# Patient Record
Sex: MALE | Race: WHITE
[De-identification: names, ages, dates, MRNs, and addresses within clinical notes are randomized per-mention and may not be internally consistent; named-entity substitution may affect disease eponyms.]

---

## 2020-01-02 NOTE — ED
SOB HPI





- General


Chief Complaint: Shortness of Breath


Stated Complaint: SOB


Time Seen by Provider: 01/02/20 05:09


Source: patient


Mode of arrival: ambulatory


Limitations: no limitations





- History of Present Illness


Initial Comments: 





This patient is a 54-year-old man who presents with the complaint of dyspnea 

going on for approximately 3 months.  The patient's does not have a primary 

physician.  He states that he was previously told that he has hypertension but 

had not been able to afford his medications has not been on these for up to 2 

years.  Patient in describing the shortness of breath it sounds like it is 

mainly exertional.  He has not had chest pain, cough or sputum, fever or chills.

 He has not noted leg pain or swelling.  No change in bowel movements or 

urination.


MD Complaint: shortness of breath


Onset/Timing: 3


-: month(s)


Severity scale (1-10): 0


Consistency: constant


Improves With: nothing


Worsens With: exertion


Associated Symptoms: denies other symptoms


Treatments Prior to Arrival: none





- Related Data


Home Oxygen Therapy: No


                                    Allergies











Allergy/AdvReac Type Severity Reaction Status Date / Time


 


No Known Allergies Allergy   Verified 01/02/20 05:00














Review of Systems


ROS Statement: 


Those systems with pertinent positive or pertinent negative responses have been 

documented in the HPI.





ROS Other: All systems not noted in ROS Statement are negative.


Constitutional: Denies: fever, chills


Respiratory: Reports: dyspnea.  Denies: cough, wheezes, hemoptysis


Cardiovascular: Reports: dyspnea on exertion.  Denies: chest pain, palpitations,

orthopnea, edema, syncope


Gastrointestinal: Denies: abdominal pain, nausea, vomiting, diarrhea, melena, 

hematochezia


Genitourinary: Denies: dysuria, hematuria


Musculoskeletal: Denies: back pain


Skin: Denies: rash


Neurological: Denies: headache





Past Medical History


Past Medical History: No Reported History


History of Any Multi-Drug Resistant Organisms: None Reported


Past Surgical History: No Surgical Hx Reported


Past Psychological History: No Psychological Hx Reported


Smoking Status: Former smoker


Past Alcohol Use History: Daily


Past Drug Use History: None Reported





General Exam


Limitations: no limitations


General appearance: alert, in no apparent distress


Head exam: Present: atraumatic, normocephalic


Eye exam: Present: normal appearance.  Absent: scleral icterus, conjunctival 

injection


ENT exam: Present: normal oropharynx


Neck exam: Present: normal inspection


Respiratory exam: Present: normal lung sounds bilaterally.  Absent: respiratory 

distress, wheezes, rales, rhonchi, stridor, accessory muscle use, decreased 

breath sounds, prolonged expiratory


Cardiovascular Exam: Present: regular rate, normal rhythm, normal heart sounds. 

Absent: systolic murmur, diastolic murmur, rubs, gallop


GI/Abdominal exam: Present: soft.  Absent: distended, tenderness, guarding, 

rebound, rigid, mass


Extremities exam: Present: normal inspection, normal capillary refill.  Absent: 

pedal edema, calf tenderness


Back exam: Present: normal inspection.  Absent: CVA tenderness (R), CVA 

tenderness (L)


Neurological exam: Present: alert


Skin exam: Present: warm, dry, intact, normal color.  Absent: rash





Course


                                   Vital Signs











  01/02/20 01/02/20 01/02/20





  04:57 05:30 06:00


 


Temperature 98.4 F  


 


Pulse Rate 88 79 78


 


Respiratory 20 21 20





Rate   


 


Blood Pressure 192/113 174/103 156/100


 


O2 Sat by Pulse 98 98 97





Oximetry   














  01/02/20 01/02/20





  06:10 06:20


 


Temperature  


 


Pulse Rate 77 77


 


Respiratory 20 19





Rate  


 


Blood Pressure 157/96 154/87


 


O2 Sat by Pulse 97 96





Oximetry  














Medical Decision Making





- Lab Data


Result diagrams: 


                                 01/02/20 05:46





                                 01/02/20 05:46


                                   Lab Results











  01/02/20 01/02/20 01/02/20 Range/Units





  05:46 05:46 05:46 


 


WBC  8.4    (3.8-10.6)  k/uL


 


RBC  5.36    (4.30-5.90)  m/uL


 


Hgb  15.8    (13.0-17.5)  gm/dL


 


Hct  46.9    (39.0-53.0)  %


 


MCV  87.5    (80.0-100.0)  fL


 


MCH  29.5    (25.0-35.0)  pg


 


MCHC  33.7    (31.0-37.0)  g/dL


 


RDW  13.1    (11.5-15.5)  %


 


Plt Count  320    (150-450)  k/uL


 


Neutrophils %  66    %


 


Lymphocytes %  21    %


 


Monocytes %  7    %


 


Eosinophils %  2    %


 


Basophils %  1    %


 


Neutrophils #  5.6    (1.3-7.7)  k/uL


 


Lymphocytes #  1.8    (1.0-4.8)  k/uL


 


Monocytes #  0.6    (0-1.0)  k/uL


 


Eosinophils #  0.2    (0-0.7)  k/uL


 


Basophils #  0.1    (0-0.2)  k/uL


 


PT     (9.0-12.0)  sec


 


INR     (<1.2)  


 


APTT     (22.0-30.0)  sec


 


Sodium   139   (137-145)  mmol/L


 


Potassium   4.6   (3.5-5.1)  mmol/L


 


Chloride   106   ()  mmol/L


 


Carbon Dioxide   22   (22-30)  mmol/L


 


Anion Gap   11   mmol/L


 


BUN   13   (9-20)  mg/dL


 


Creatinine   0.74   (0.66-1.25)  mg/dL


 


Est GFR (CKD-EPI)AfAm   >90   (>60 ml/min/1.73 sqM)  


 


Est GFR (CKD-EPI)NonAf   >90   (>60 ml/min/1.73 sqM)  


 


Glucose   130 H   (74-99)  mg/dL


 


Calcium   9.4   (8.4-10.2)  mg/dL


 


Total Bilirubin   0.9   (0.2-1.3)  mg/dL


 


AST   27   (17-59)  U/L


 


ALT   26   (4-49)  U/L


 


Alkaline Phosphatase   46   ()  U/L


 


Troponin I     (0.000-0.034)  ng/mL


 


NT-Pro-B Natriuret Pep    343  pg/mL


 


Total Protein   8.1   (6.3-8.2)  g/dL


 


Albumin   4.5   (3.5-5.0)  g/dL














  01/02/20 01/02/20 Range/Units





  05:46 05:46 


 


WBC    (3.8-10.6)  k/uL


 


RBC    (4.30-5.90)  m/uL


 


Hgb    (13.0-17.5)  gm/dL


 


Hct    (39.0-53.0)  %


 


MCV    (80.0-100.0)  fL


 


MCH    (25.0-35.0)  pg


 


MCHC    (31.0-37.0)  g/dL


 


RDW    (11.5-15.5)  %


 


Plt Count    (150-450)  k/uL


 


Neutrophils %    %


 


Lymphocytes %    %


 


Monocytes %    %


 


Eosinophils %    %


 


Basophils %    %


 


Neutrophils #    (1.3-7.7)  k/uL


 


Lymphocytes #    (1.0-4.8)  k/uL


 


Monocytes #    (0-1.0)  k/uL


 


Eosinophils #    (0-0.7)  k/uL


 


Basophils #    (0-0.2)  k/uL


 


PT  10.3   (9.0-12.0)  sec


 


INR  1.0   (<1.2)  


 


APTT  24.6   (22.0-30.0)  sec


 


Sodium    (137-145)  mmol/L


 


Potassium    (3.5-5.1)  mmol/L


 


Chloride    ()  mmol/L


 


Carbon Dioxide    (22-30)  mmol/L


 


Anion Gap    mmol/L


 


BUN    (9-20)  mg/dL


 


Creatinine    (0.66-1.25)  mg/dL


 


Est GFR (CKD-EPI)AfAm    (>60 ml/min/1.73 sqM)  


 


Est GFR (CKD-EPI)NonAf    (>60 ml/min/1.73 sqM)  


 


Glucose    (74-99)  mg/dL


 


Calcium    (8.4-10.2)  mg/dL


 


Total Bilirubin    (0.2-1.3)  mg/dL


 


AST    (17-59)  U/L


 


ALT    (4-49)  U/L


 


Alkaline Phosphatase    ()  U/L


 


Troponin I   0.019  (0.000-0.034)  ng/mL


 


NT-Pro-B Natriuret Pep    pg/mL


 


Total Protein    (6.3-8.2)  g/dL


 


Albumin    (3.5-5.0)  g/dL














- EKG Data


-: EKG Interpreted by Me


EKG shows normal: sinus rhythm, axis (Normal), intervals (Normal), QRS complexes

(Q waves in the inferior leads, suggestive of possible old inferior infarct.), 

ST-T waves (Normal)


Rate: normal (Rate 83 bpm)


Interpretation: LVH





Disposition


Clinical Impression: 


 CHF (congestive heart failure), Hypertension





Disposition: ADMITTED AS IP TO THIS HOSP


Condition: Good


Is patient prescribed a controlled substance at d/c from ED?: No


Referrals: 


None,Stated [Primary Care Provider] - 1-2 days

## 2020-01-02 NOTE — ECHOF
Referral Reason:chf



MEASUREMENTS

--------

HEIGHT: 170.2 cm

WEIGHT: 127.0 kg

BP: 

IVSd:   1.5 cm     (0.6 - 1.1)

LVIDd:   4.8 cm     (3.9 - 5.3)

LVPWd:   1.4 cm     (0.6 - 1.1)

IVSs:   1.5 cm

LVIDs:   3.9 cm

LVPWs:   1.4 cm

LA Diam:   3.2 cm     (2.7 - 3.8)

Ao Diam:   3.7 cm     (2.0 - 3.7)

AV Cusp:   1.8 cm     (1.5 - 2.6)

MV EXCURSION:   14.577 mm     (> 18.000)

MV EF SLOPE:   57 mm/s     (70 - 150)

EPSS:   0.6 cm

MV E Lake:   0.37 m/s

MV DecT:   191 ms

MV A Lake:   0.59 m/s

MV E/A Ratio:   0.63 

RAP:   5.00 mmHg

RVSP:   15.61 mmHg







FINDINGS

--------

Sinus rhythm.

Morbid Obesity   This was a techncally difficult study with suboptimal views, , Lumason utilized for 
enhancement of images.

The left ventricular size is normal.   There is moderate concentric left ventricular hypertrophy.   T
here is mild global hypokinesis of LV .   Overall left ventricular systolic function is mildly impair
ed with, an EF between 45 - 50 %.

The right ventricle is normal in size.

The left atrial size is normal.

The right atrial size is normal.

5.0mg OF Lumason UTLIZED: 2 OR MORE WALL SEGMENTS NOT VISUALIZED.

The aortic valve is trileaflet, and appears structurally normal. No aortic stenosis or regurgitation.


The mitral valve is normal.   Mild mitral regurgitation is present.

Mild tricuspid regurgitation present.   Right ventricular systolic pressure is normal at < 35 mmHg.  
 The right ventricular systolic pressure, as measured by Doppler, is 15.61mmHg.

The pulmonic valve was not well visualized.   There is no pulmonic regurgitation present.

The aortic root size is normal.

There is no pericardial effusion.



CONCLUSIONS

--------

1. Sinus rhythm.

2. Morbid Obesity

3. This was a techncally difficult study with suboptimal views, , Lumason utilized for enhancement of
 images.

4. The left ventricular size is normal.

5. There is moderate concentric left ventricular hypertrophy.

6. There is mild global hypokinesis of LV .

7. Overall left ventricular systolic function is mildly impaired with, an EF between 45 - 50 %.

8. The left atrial size is normal.

9. 5.0mg OF Lumason UTLIZED: 2 OR MORE WALL SEGMENTS NOT VISUALIZED.

10. The aortic valve is trileaflet, and appears structurally normal. No aortic stenosis or regurgitat
ion.

11. Mild mitral regurgitation is present.

12. Mild tricuspid regurgitation present.

13. Right ventricular systolic pressure is normal at < 35 mmHg.

14. The pulmonic valve was not well visualized.

15. There is no pericardial effusion.





SONOGRAPHER: Minoo Jimenes RDCS

## 2020-01-02 NOTE — P.CRDCN
History of Present Illness


Consult date: 01/02/20


Requesting physician: Jose Francisco Baker


Consult reason: hypertension, shortness of breath


Chief complaint: Shortness of breath


History of present illness: 


This is a 54-year-old  gentleman with prior history of hypertension, he

stopped taking his medication about 2 years ago because of a job change, he went

through a period of time with no insurance.  He is nondiabetic, no 

hyperlipidemia, smoking more than a year ago, he does drink alcohol daily, one 

or 2 drinks per day.  According to the patient, for the past 2 months the 

patient has been progressively more short of breath.  He states he notices his 

shortness of breath when he exerts himself even minimally, he also gets short of

breath when he bends forward.  When he sits down and rests he states that the 

breathing is not nearly as bad.  He went to [facility] prior to coming to the 

hospital because he thought his blood pressure may be running high, he states 

that it was very high and he subsequently came to the hospital for further 

evaluation and treatment.  His chest x-ray on presentation here revealed low 

lung volumes and cardiomegaly with mild central vascular congestion.  His EKG on

presentation here shows normal sinus rhythm with LVH strain pattern, and 

inferior Q waves.  Blood pressure on arrival here 192/113, heart rate in the 

80s, 98% on room air.  Let pressure this morning 140/80 with a heart rate in the

80s, 97% on room air.  White blood cell count 8.4, hemoglobin 15.8, platelet 

count 320.  Sodium 139, potassium 4.6, BUN 13, creatinine 0.7.  Glucose on 

arrival 130.  Troponin 0.019, .  Patient was initiated on IV Lasix in the

emergency room, he has diuresed well since then.  At the time of my examination 

this morning, patient states that he is feeling much better, he has not yet 

ambulated to see if the shortness of breath with ambulation has improved.








Past Medical History


Past Medical History: No Reported History


History of Any Multi-Drug Resistant Organisms: None Reported


Past Surgical History: No Surgical Hx Reported


Past Psychological History: No Psychological Hx Reported


Smoking Status: Former smoker


Past Alcohol Use History: Daily


Past Drug Use History: None Reported





Medications and Allergies


                                Home Medications











 Medication  Instructions  Recorded  Confirmed  Type


 


Naproxen 500 mg PO DAILY 01/02/20 01/02/20 History


 


Omega-3 Fatty Acids/Fish Oil [Fish 1 cap PO DAILY 01/02/20 01/02/20 History





Oil 1,000 mg Softgel]    


 


Vitamin B Complex 1 cap PO DAILY 01/02/20 01/02/20 History








                                    Allergies











Allergy/AdvReac Type Severity Reaction Status Date / Time


 


No Known Allergies Allergy   Verified 01/02/20 07:03














Physical Exam


Vitals: 


                                   Vital Signs











  Temp Pulse Resp BP Pulse Ox


 


 01/02/20 07:56    20  


 


 01/02/20 07:50   82  22  144/84  97


 


 01/02/20 07:40   79  13  128/71  96


 


 01/02/20 07:30   79  15  128/71  97


 


 01/02/20 07:20   75  18  144/84  96


 


 01/02/20 07:10   75  18  179/97  96


 


 01/02/20 07:00   80  18  143/92  96


 


 01/02/20 06:50   78  16  143/92  99


 


 01/02/20 06:40   72  16  159/95  97


 


 01/02/20 06:30   78  16  156/98  98


 


 01/02/20 06:20   77  19  154/87  96


 


 01/02/20 06:10   77  20  157/96  97


 


 01/02/20 06:00   78  20  156/100  97


 


 01/02/20 05:30   79  21  174/103  98


 


 01/02/20 04:57  98.4 F  88  20  192/113  98








                                Intake and Output











 01/01/20 01/02/20 01/02/20





 22:59 06:59 14:59


 


Other:   


 


  Weight  127.006 kg 














PHYSICAL EXAMINATION: 





GENERAL: 54-year-old  gentleman in no acute distress at the time of my 

examination





HEENT: Head is atraumatic, normocephalic.  Pupils equal, round.  Sclera 

anicteric. Conjunctiva are clear.  Mucous membranes of the mouth are moist.  

Neck is supple.  There is no elevated jugular venous pressure.  No carotid  

bruit is heard.





HEART EXAMINATION: Heart S1, S2 normal.  No murmur or gallop heard.





CHEST EXAMINATION: Lungs are clear with diminished air entry to bilateral bases





ABDOMEN:  Soft, obese, nontender. Bowel sounds are heard. No organomegaly noted.


 


EXTREMITIES: 2+ peripheral pulses with trace evidence of peripheral edema and no

calf tenderness noted.  Patient does have a rash noted on his bilateral lower 

extremities, he says that this is been present for about 2 months





NEUROLOGIC patient is awake, alert and oriented 3.


 


.


 








Results





                                 01/02/20 05:46





                                 01/02/20 05:46


                                 Cardiac Enzymes











  01/02/20 01/02/20 Range/Units





  05:46 05:46 


 


AST  27   (17-59)  U/L


 


Troponin I   0.019  (0.000-0.034)  ng/mL








                                   Coagulation











  01/02/20 Range/Units





  05:46 


 


PT  10.3  (9.0-12.0)  sec


 


APTT  24.6  (22.0-30.0)  sec








                                     Lipids











  01/02/20 Range/Units





  05:46 


 


Triglycerides  75  (<150)  mg/dL


 


Cholesterol  185  (<200)  mg/dL


 


HDL Cholesterol  34 L  (40-60)  mg/dL








                                       CBC











  01/02/20 Range/Units





  05:46 


 


WBC  8.4  (3.8-10.6)  k/uL


 


RBC  5.36  (4.30-5.90)  m/uL


 


Hgb  15.8  (13.0-17.5)  gm/dL


 


Hct  46.9  (39.0-53.0)  %


 


Plt Count  320  (150-450)  k/uL








                          Comprehensive Metabolic Panel











  01/02/20 Range/Units





  05:46 


 


Sodium  139  (137-145)  mmol/L


 


Potassium  4.6  (3.5-5.1)  mmol/L


 


Chloride  106  ()  mmol/L


 


Carbon Dioxide  22  (22-30)  mmol/L


 


BUN  13  (9-20)  mg/dL


 


Creatinine  0.74  (0.66-1.25)  mg/dL


 


Glucose  130 H  (74-99)  mg/dL


 


Calcium  9.4  (8.4-10.2)  mg/dL


 


AST  27  (17-59)  U/L


 


ALT  26  (4-49)  U/L


 


Alkaline Phosphatase  46  ()  U/L


 


Total Protein  8.1  (6.3-8.2)  g/dL


 


Albumin  4.5  (3.5-5.0)  g/dL








                               Current Medications











Generic Name Dose Route Start Last Admin





  Trade Name Freq  PRN Reason Stop Dose Admin


 


Aspirin  325 mg  01/03/20 06:30 





  Aspirin  PO  





  DAILY SHUN  


 


Furosemide  40 mg  01/02/20 06:30  01/02/20 06:39





  Lasix  IV   Not Given





  Q12H SHUN  


 


Lisinopril  10 mg  01/02/20 09:00 





  Zestril  PO  





  DAILY SHUN  


 


Nitroglycerin  1 inch  01/02/20 09:00 





  Nitro-Bid Oint  TOPICAL  





  QID SHUN  








                                Intake and Output











 01/01/20 01/02/20 01/02/20





 22:59 06:59 14:59


 


Other:   


 


  Weight  127.006 kg 








                                        





                                 01/02/20 05:46 





                                 01/02/20 05:46 











EKG Interpretations (text)





EKG shows normal sinus rhythm with LVH strain pattern and inferior Q waves





Assessment and Plan


Plan: 


Assessment and plan


#1 hypertensive urgency


#2 congestive heart failure, LV function unknown


#3 history of hypertension, not on any medications at home


#4 daily EtOH use


#5 prior history of smoking


#6 obesity








Plan


We will obtain an echocardiogram with Doppler study, decrease aspirin 81 mg 

daily, continue IV Lasix, Cipro 10 mg has been added, we will also add 

metoprolol 25 mg one tablet by mouth twice a day, discontinue the Nitropaste.  

Check a fasting lipid profile as well as a hemoglobin A1c.  Further 

recommendations to follow.





DNP note has been reviewed, I agree with a documented findings and plan of care.

 Patient was seen and examined.

## 2020-01-02 NOTE — XR
EXAMINATION TYPE: XR chest 2V

 

DATE OF EXAM: 1/2/2020

 

COMPARISON: NONE

 

HISTORY: Difficulty in breathing.

 

TECHNIQUE:  Frontal and lateral views of the chest are obtained.

 

FINDINGS: Low lung volumes and cardiomegaly. Overlying EKG leads. Perhaps mild central vascular conge
stion.  There is no suspicious focal air space opacity, pleural effusion, or pneumothorax seen.  The 
cardiac silhouette size is within normal limits.   The osseous structures are intact.

 

IMPRESSION:  Low lung volumes and cardiomegaly with perhaps mild central vascular congestion. Correla
te for CHF exacerbation.

## 2020-01-02 NOTE — HP
HISTORY AND PHYSICAL



A 54-year-old white male presents with increasing dyspnea in the past 3 months.  He has

a history of hypertension and he has been off his medications for the past 1-1/2 years.

He has progressive shortness of breath and 20 pounds weight gain with mainly exertional

and 20 pounds weight gain over the past 2-3 months.  He denies any cough, sputum

production, fever, chills, it is worse when bends over, worse with exertion, improves

with nothing and denies any of the symptoms but weight gain.



ALLERGIES:

No known drug allergies.



14 POINT REVIEW OF SYSTEMS:

Negative except for as mentioned in HPI.



PAST MEDICAL HISTORY:

Hypertension.



SOCIAL HISTORY:

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  Daily alcohol.



PHYSICAL EXAM:

BMI is over 40.  He appears in no acute distress.

LUNGS:  Rales at the bases.  Decreased breath sounds.

HEART:  S1, S2 without any murmurs, rubs, gallops.

GI: Distended, obesity.

HEMATOLOGY:  2+ pedal edema, bilaterally.

PSYCH: Flat mood and affect.

NEUROLOGIC:  Cranial nerves intact.

SKIN: No rash, excoriation, bruising.



Blood pressure is 192-156 over low 100s, temp 98.4, pulse 78 [birth year]/08/1980, respiratory 18

22.



LABS:

Reviewed. Hemoglobin is 15.8, hematocrit 46, white count 8.4, BUN is 13, creatinine is

0.74, glucose 130.  Liver enzymes are normal.



ASSESSMENT:

1. Congestive heart failure, acute diastolic congestive heart failure, hypertension

    acceleration.

2. Obesity, rule out obstructive sleep apnea.

IV Lasix, blood pressure control, echo.  Please see further orders, await Cardiology

recommendations.





MMODL / IJN: [id number] / Job#: [id number]

## 2020-01-03 NOTE — P.DS
Providers


Date of admission: 


01/02/20 06:28





Expected date of discharge: 01/03/20


Attending physician: 


Jose Francisco Baker





Consults: 





                                        





01/02/20 06:28


Consult Physician Routine 


   Consulting Provider: Shawn Carey


   Consult Reason/Comments: New onset CHF.


   Do you want consulting provider notified?: Yes











Primary care physician: 


Stated None


Johnsonladylan





Hospital Course: 


Final Diagnoses:


Acute CHF exacerbation, systolic dysfunction, EF 45-50%


Accelerated hypertension


Obesity, morbid, BMI 44.6


Possible obstructive sleep apnea, will need outpatient sleep study.


Former nicotine dependence














This is a 54-year-old gentleman admitted with progressive dyspnea accompanied by

weight gain, hypertension and multiple other medical issues.  Echo suboptimal 

,reported EF 45-50%, moderate concentric left ventricular hypertrophy, mild 

global hypokinesis of LV.  Evaluated by cardiology. Diuresed well on Lasix IV 

push.  Significant clinical improvement.  Patient will be discharged home 

pending clearance, diuretic recommendations from cardiology, in a stable 

condition with guarded prognosis.











PHYSICAL EXAM:


GENERAL: Alert and oriented 3, no acute distress


CARDIOVASCULAR:  S1, S2 regular. No murmur


RESPIRATION: Breath sounds diminished in the bases. No rhonchi or crackles. No 

bronchial breathing.


ABDOMEN:  Soft,  nontender . No guarding. no masses palpable.Bowel sounds heard.


NERVOUS SYSTEM:  No focal deficits.














The impression and plan of care has been dictated as directed.





:


I performed a history and examination of this patient,  discussed the same with 

the dictator.  I agree with the dictator's note ,documented as a scribe.  Any 

additional findings or plans will be noted.





Patient Condition at Discharge: Stable





Plan - Discharge Summary


Discharge Rx Participant: Yes


New Discharge Prescriptions: 


New


   Aspirin 81 mg PO DAILY  chew


   Metoprolol Tartrate [Lopressor] 25 mg PO BID #60 tab


   Lisinopril [Zestril] 10 mg PO DAILY #30 tab





Continue


   Omega-3 Fatty Acids/Fish Oil [Fish Oil 1,000 mg Softgel] 1 cap PO DAILY


   Vitamin B Complex 1 cap PO DAILY





Discontinued


   Naproxen 500 mg PO DAILY


Discharge Medication List





Omega-3 Fatty Acids/Fish Oil [Fish Oil 1,000 mg Softgel] 1 cap PO DAILY 01/02/20

[History]


Vitamin B Complex 1 cap PO DAILY 01/02/20 [History]


Aspirin 81 mg PO DAILY  chew 01/03/20 [Rx]


Lisinopril [Zestril] 10 mg PO DAILY #30 tab 01/03/20 [Rx]


Metoprolol Tartrate [Lopressor] 25 mg PO BID #60 tab 01/03/20 [Rx]








Follow up Appointment(s)/Referral(s): 


Allen Gudino MD [STAFF PHYSICIAN] - 01/16/20 3:30 pm


(Cardiologist- 





Please bring photo ID, insurance card and a list of your medications to follow 

up appointment. Please arrive a few minutes early to fill out new patient 

paperwork.)


Jose Francisco Baker MD [STAFF PHYSICIAN] - 3 Days


Ambulatory/Diagnostic Orders: 


Complete Blood Count w/diff [LAB.AMB] Time Frame: 3 Days, Location: None 

Selected


Patient Instructions/Handouts:  Heart Failure (DC), Heart Healthy Diet (DC), At-

Risk Alcohol Use (DC)


Activity/Diet/Wound Care/Special Instructions: 


Lasix as per cardiology





CHF


1. Weigh yourself every morning after you urinate. If you gain 2-3 pounds 

overnight or 5 pounds in one week, call your primary physician for guidance on 

your medications. Keep a log of your weights.


2. Avoid salt, or foods with hidden salt. Extra salt makes your heart work 

harder and traps the fluid in your body for longer.


3. Take all of your medications as directed, especially your water pills. NEVER 

skip a dose.


4. Elevate your legs when you are not up moving around to help with circulation 

and prevent swelling.


5. Call your physician if you notice any extra swelling in your legs, ankles, 

feet or abdomen, if you have a new dry cough, if your shortness of breath 

worsens with activity or at rest, or if you feel more fatigued.














Diet: heart healthy, low cholesterol,VERONICA

## 2020-01-03 NOTE — P.PN
Subjective


Progress Note Date: 01/03/20





This is a 54-year-old  gentleman with prior history of hypertension, he

stopped taking his medication about 2 years ago because of a job change, he went

through a period of time with no insurance.  He is nondiabetic, no 

hyperlipidemia, smoking more than a year ago, he does drink alcohol daily, one 

or 2 drinks per day.  According to the patient, for the past 2 months the 

patient has been progressively more short of breath.  He states he notices his 

shortness of breath when he exerts himself even minimally, he also gets short of

breath when he bends forward.  When he sits down and rests he states that the 

breathing is not nearly as bad.  He went to [facility] prior to coming to the 

hospital because he thought his blood pressure may be running high, he states 

that it was very high and he subsequently came to the hospital for further 

evaluation and treatment.  His chest x-ray on presentation here revealed low 

lung volumes and cardiomegaly with mild central vascular congestion.  His EKG on

presentation here shows normal sinus rhythm with LVH strain pattern, and 

inferior Q waves.  Blood pressure on arrival here 192/113, heart rate in the 

80s, 98% on room air.  Blood pressure this morning 140/80 with a heart rate in 

the 80s, 97% on room air.  White blood cell count 8.4, hemoglobin 15.8, platelet

count 320.  Sodium 139, potassium 4.6, BUN 13, creatinine 0.7.  Glucose on 

arrival 130.  Troponin 0.019, .  Patient was initiated on IV Lasix in the

emergency room, he has diuresed well since then.  At the time of my examination 

this morning, patient states that he is feeling much better, he has not yet 

ambulated to see if the shortness of breath with ambulation has improved.








01/03/2020


Patient seen and examined this morning, feeling significantly better overall.  

Blood pressure is stable, 120/80 with a heart rate in the 60s, 96% on room 

air.according to the patient, he diuresed well through the night last 

night.echocardiogram with Doppler study revealed an ejection fraction of 45-50








Objective





- Vital Signs


Vital signs: 


                                   Vital Signs











Temp  97.4 F L  01/03/20 08:03


 


Pulse  67   01/03/20 11:01


 


Resp  18   01/03/20 11:01


 


BP  129/82   01/03/20 11:01


 


Pulse Ox  96   01/03/20 11:01








                                 Intake & Output











 01/02/20 01/03/20 01/03/20





 18:59 06:59 18:59


 


Intake Total 740  


 


Output Total   800


 


Balance 740  -800


 


Weight 129.2 kg 129.2 kg 


 


Intake:   


 


  Oral 740  


 


Output:   


 


  Urine   800


 


Other:   


 


  # Voids 1 1 














- Exam





PHYSICAL EXAMINATION: 





GENERAL: 54-year-old  gentleman in no acute distress at the time of my 

examination





HEENT: Head is atraumatic, normocephalic.  Pupils equal, round.  Sclera 

anicteric. Conjunctiva are clear.  Mucous membranes of the mouth are moist.  

Neck is supple.  There is no elevated jugular venous pressure.  No carotid  

bruit is heard.





HEART EXAMINATION: Heart S1, S2 normal.  No murmur or gallop heard.





CHEST EXAMINATION: Lungs are clear to auscultation and percussion





ABDOMEN:  Soft, obese, nontender. Bowel sounds are heard. No organomegaly noted.


 


EXTREMITIES: 2+ peripheral pulses with no evidence of peripheral edema and no 

calf tenderness noted.  Patient does have a rash noted on his bilateral lower 

extremities, he says that this is been present for about 2 months





NEUROLOGIC patient is awake, alert and oriented 3.


 





- Labs


CBC & Chem 7: 


                                 01/02/20 05:46





                                 01/02/20 05:46


Labs: 


                  Abnormal Lab Results - Last 24 Hours (Table)











  01/02/20 Range/Units





  05:46 


 


Hemoglobin A1c  6.3 H  (4.0-6.0)  %














Assessment and Plan


Plan: 


Assessment and plan


#1 hypertensive urgency


#2 congestive heart failure, LV function unknown


#3 history of hypertension, not on any medications at home


#4 daily EtOH use


#5 prior history of smoking


#6 obesity








Plan


We will obtain an echocardiogram with Doppler study, decrease aspirin 81 mg 

daily, continue IV Lasix, Cipro 10 mg has been added, we will also add 

metoprolol 25 mg one tablet by mouth twice a day, discontinue the Nitropaste.  

Check a fasting lipid profile as well as a hemoglobin A1c.  Further 

recommendations to follow.





DNP note has been reviewed, I agree with a documented findings and plan of care.

 Patient was seen and examined.

## 2021-10-20 ENCOUNTER — HOSPITAL ENCOUNTER (OUTPATIENT)
Dept: HOSPITAL 47 - RADNMMAIN | Age: 56
Discharge: HOME | End: 2021-10-20
Attending: FAMILY MEDICINE
Payer: COMMERCIAL

## 2021-10-20 DIAGNOSIS — R07.9: Primary | ICD-10-CM

## 2021-10-20 PROCEDURE — 93017 CV STRESS TEST TRACING ONLY: CPT

## 2021-10-20 PROCEDURE — 93306 TTE W/DOPPLER COMPLETE: CPT

## 2021-10-20 PROCEDURE — 78452 HT MUSCLE IMAGE SPECT MULT: CPT

## 2021-10-20 NOTE — NM
EXAMINATION TYPE: NM stress cardiolite complete

 

DATE OF EXAM: 10/20/2021

 

COMPARISON: NONE

 

HISTORY: Chest pain

 

TECHNIQUE:  After the intravenous administration of 9.4 mCi Tc 99m Sestamibi - Rest images obtained 5
5 minutes post injection.  The patient exercised using a  BIRDIE protocol and 1 minute prior to peak e
xercise was injected with 25.9 mCi Tc 99m Sestamibi - Stress images obtained 50 minutes post injectio
n.

 

FINDINGS: 

 

Targeted heart rate was achieved during performance of the study. Review of stress and rest SPECT annmarie
ges demonstrates area of fixed defect involving the apex and inferior wall with stress-induced revers
ible changes seen involving the anterior and inferior apical portion of the myocardium..  Small area 
of reduced uptake and stress images involving the inferior septum also noted. Gated analysis shows an
 estimated left ventricular ejection fraction of 44 %. Report called to referring clinician 10/20/202
1 11:49 AM

 

 

 

IMPRESSION:

1. Findings are compatible with areas of stress-induced reversible ischemia involving the apical, api
aj anterior and apical inferior wall as well as a small area involving the inferior lateral myocardi
um.

2. Estimated ejection fraction of 44%.

## 2021-10-20 NOTE — P.STRESS
- Stress Test Note


Stress Test Results/Findings: 


Exam Performed: NM stress lexiscan cardiolite


Exam Date: 10/20/21


Reason for Exam: ABNORMAL EKG





Height: 5 ft 7 in


Weight: 129.5 kg


Protocol: LEXISCAN


Stage: NA


Duration of Exercise: 5 MINUTES





Resting Heart Rate: 70


Resting Blood Pressure: 148/88


Maximum Achieved Heart Rate: 100


Maximum Achieved Blood Pressure: 149/87


85% PMHR: 139


100% PMHR: 163


METS: NA


Technologist Comment: 





Stress Test Results/Findings: 





Baseline heart rate 70 beats only, Baseline blood pressure 148/88.  His mercury


Baseline 12-lead EKG shows sinus rhythm with Q waves in the inferior leads with 

1 mm ST elevation


Patient received Lexiscan infusion per protocol


No significant change in heart rate and blood pressure line no new ECG 

abnormalities


Nuclear portion will be reported separately

## 2021-10-21 NOTE — EST
Stress Test Results/Findings: 

Exam Performed: NM stress lexiscan cardiolite

Exam Date: 10/20/21

Reason for Exam: ABNORMAL EKG



Height: 5 ft 7 in

Weight: 129.5 kg

Protocol: LEXISCAN

Stage: NA

Duration of Exercise: 5 MINUTES



Resting Heart Rate: 70

Resting Blood Pressure: 148/88

Maximum Achieved Heart Rate: 100

Maximum Achieved Blood Pressure: 149/87

85% PMHR: 139

100% PMHR: 163

METS: NA

Technologist Comment: 



Stress Test Results/Findings: 



Baseline heart rate 70 beats only, Baseline blood pressure 148/88.  His mercury

Baseline 12-lead EKG shows sinus rhythm with Q waves in the inferior leads with 
1 mm ST elevation

Patient received Lexiscan infusion per protocol

No significant change in heart rate and blood pressure line no new ECG 
abnormalities

Nuclear portion will be reported separately

MTDD